# Patient Record
Sex: FEMALE | Race: OTHER | NOT HISPANIC OR LATINO
[De-identification: names, ages, dates, MRNs, and addresses within clinical notes are randomized per-mention and may not be internally consistent; named-entity substitution may affect disease eponyms.]

---

## 2020-11-16 PROBLEM — Z00.00 ENCOUNTER FOR PREVENTIVE HEALTH EXAMINATION: Status: ACTIVE | Noted: 2020-11-16

## 2020-11-18 ENCOUNTER — RESULT CHARGE (OUTPATIENT)
Age: 35
End: 2020-11-18

## 2020-11-18 ENCOUNTER — OUTPATIENT (OUTPATIENT)
Dept: OUTPATIENT SERVICES | Facility: HOSPITAL | Age: 35
LOS: 1 days | Discharge: HOME | End: 2020-11-18

## 2020-11-18 ENCOUNTER — NON-APPOINTMENT (OUTPATIENT)
Age: 35
End: 2020-11-18

## 2020-11-18 ENCOUNTER — APPOINTMENT (OUTPATIENT)
Dept: OBGYN | Facility: CLINIC | Age: 35
End: 2020-11-18
Payer: MEDICAID

## 2020-11-18 VITALS
WEIGHT: 193 LBS | BODY MASS INDEX: 29.25 KG/M2 | HEIGHT: 68.11 IN | DIASTOLIC BLOOD PRESSURE: 66 MMHG | SYSTOLIC BLOOD PRESSURE: 100 MMHG

## 2020-11-18 DIAGNOSIS — Z34.90 ENCOUNTER FOR SUPERVISION OF NORMAL PREGNANCY, UNSPECIFIED, UNSPECIFIED TRIMESTER: ICD-10-CM

## 2020-11-18 DIAGNOSIS — O09.30 SUPERVISION OF PREGNANCY WITH INSUFFICIENT ANTENATAL CARE, UNSPECIFIED TRIMESTER: ICD-10-CM

## 2020-11-18 LAB
BILIRUB UR QL STRIP: NORMAL
CLARITY UR: CLEAR
COLLECTION METHOD: NORMAL
GLUCOSE UR-MCNC: NORMAL
HCG UR QL: 0.2 EU/DL
HGB UR QL STRIP.AUTO: NORMAL
KETONES UR-MCNC: NORMAL
LEUKOCYTE ESTERASE UR QL STRIP: NORMAL
NITRITE UR QL STRIP: NORMAL
PH UR STRIP: 5
PROT UR STRIP-MCNC: NORMAL
SP GR UR STRIP: 1

## 2020-11-18 PROCEDURE — 99204 OFFICE O/P NEW MOD 45 MIN: CPT | Mod: 25

## 2020-11-18 PROCEDURE — 76815 OB US LIMITED FETUS(S): CPT | Mod: 26

## 2020-11-18 RX ORDER — PRENATAL VIT/IRON FUM/FOLIC AC 28MG-0.8MG
28-0.8 TABLET ORAL DAILY
Qty: 30 | Refills: 3 | Status: ACTIVE | COMMUNITY
Start: 2020-11-18 | End: 1900-01-01

## 2020-11-19 LAB
BASOPHILS # BLD AUTO: 0.06 K/UL
BASOPHILS NFR BLD AUTO: 0.8 %
C TRACH RRNA SPEC QL NAA+PROBE: NOT DETECTED
EOSINOPHIL # BLD AUTO: 0.07 K/UL
EOSINOPHIL NFR BLD AUTO: 1 %
GLUCOSE 1H P 50 G GLC PO SERPL-MCNC: 146 MG/DL
HCT VFR BLD CALC: 30.2 %
HGB BLD-MCNC: 9.7 G/DL
IMM GRANULOCYTES NFR BLD AUTO: 1.7 %
LYMPHOCYTES # BLD AUTO: 2.1 K/UL
LYMPHOCYTES NFR BLD AUTO: 29 %
MAN DIFF?: NORMAL
MCHC RBC-ENTMCNC: 27.1 PG
MCHC RBC-ENTMCNC: 32.1 G/DL
MCV RBC AUTO: 84.4 FL
MONOCYTES # BLD AUTO: 0.48 K/UL
MONOCYTES NFR BLD AUTO: 6.6 %
N GONORRHOEA RRNA SPEC QL NAA+PROBE: NOT DETECTED
NEUTROPHILS # BLD AUTO: 4.42 K/UL
NEUTROPHILS NFR BLD AUTO: 60.9 %
PLATELET # BLD AUTO: 261 K/UL
RBC # BLD: 3.58 M/UL
RBC # FLD: 13.4 %
SOURCE AMPLIFICATION: NORMAL
WBC # FLD AUTO: 7.25 K/UL

## 2020-11-20 ENCOUNTER — NON-APPOINTMENT (OUTPATIENT)
Age: 35
End: 2020-11-20

## 2020-11-20 LAB
ABO + RH PNL BLD: NORMAL
BLD GP AB SCN SERPL QL: NORMAL
HBV SURFACE AG SER QL: NONREACTIVE
HGB A MFR BLD: 97.4 %
HGB A2 MFR BLD: 2.6 %
HGB FRACT BLD-IMP: NORMAL
HIV1+2 AB SPEC QL IA.RAPID: NONREACTIVE
MEV IGG FLD QL IA: 10.4 AU/ML
MEV IGG+IGM SER-IMP: NEGATIVE
MUV AB SER-ACNC: POSITIVE
MUV IGG SER QL IA: 206 AU/ML
RUBV IGG FLD-ACNC: 5.7 INDEX
RUBV IGG SER-IMP: POSITIVE
T PALLIDUM AB SER QL IA: NEGATIVE
VZV AB TITR SER: POSITIVE
VZV IGG SER IF-ACNC: 1040 INDEX

## 2020-11-21 DIAGNOSIS — O99.019 ANEMIA COMPLICATING PREGNANCY, UNSPECIFIED TRIMESTER: ICD-10-CM

## 2020-11-21 LAB
BACTERIA UR CULT: NORMAL
GP B STREP DNA SPEC QL NAA+PROBE: DETECTED
GP B STREP DNA SPEC QL NAA+PROBE: NORMAL
HPV HIGH+LOW RISK DNA PNL CVX: NOT DETECTED
LEAD BLD-MCNC: <1 UG/DL
SOURCE GBS: NORMAL

## 2020-11-21 RX ORDER — FERROUS SULFATE 325(65) MG
325 (65 FE) TABLET ORAL 3 TIMES DAILY
Qty: 90 | Refills: 2 | Status: ACTIVE | COMMUNITY
Start: 2020-11-19 | End: 1900-01-01

## 2020-11-22 LAB
M TB IFN-G BLD-IMP: NEGATIVE
QUANTIFERON TB PLUS MITOGEN MINUS NIL: 4.25 IU/ML
QUANTIFERON TB PLUS NIL: 0.02 IU/ML
QUANTIFERON TB PLUS TB1 MINUS NIL: 0.04 IU/ML
QUANTIFERON TB PLUS TB2 MINUS NIL: 0.03 IU/ML

## 2020-11-24 ENCOUNTER — NON-APPOINTMENT (OUTPATIENT)
Age: 35
End: 2020-11-24

## 2020-11-24 ENCOUNTER — INPATIENT (INPATIENT)
Facility: HOSPITAL | Age: 35
LOS: 2 days | Discharge: HOME | End: 2020-11-27
Attending: OBSTETRICS & GYNECOLOGY | Admitting: OBSTETRICS & GYNECOLOGY
Payer: MEDICAID

## 2020-11-24 ENCOUNTER — APPOINTMENT (OUTPATIENT)
Dept: ANTEPARTUM | Facility: CLINIC | Age: 35
End: 2020-11-24

## 2020-11-24 VITALS
HEART RATE: 68 BPM | DIASTOLIC BLOOD PRESSURE: 76 MMHG | RESPIRATION RATE: 18 BRPM | HEIGHT: 68.5 IN | SYSTOLIC BLOOD PRESSURE: 116 MMHG | WEIGHT: 191.8 LBS | TEMPERATURE: 98 F

## 2020-11-24 LAB
AMPHET UR-MCNC: NEGATIVE — SIGNIFICANT CHANGE UP
APPEARANCE UR: ABNORMAL
AR GENE MUT ANL BLD/T: NORMAL
BACTERIA # UR AUTO: ABNORMAL
BARBITURATES UR SCN-MCNC: NEGATIVE — SIGNIFICANT CHANGE UP
BASOPHILS # BLD AUTO: 0.08 K/UL — SIGNIFICANT CHANGE UP (ref 0–0.2)
BASOPHILS NFR BLD AUTO: 0.8 % — SIGNIFICANT CHANGE UP (ref 0–1)
BENZODIAZ UR-MCNC: NEGATIVE — SIGNIFICANT CHANGE UP
BILIRUB UR-MCNC: NEGATIVE — SIGNIFICANT CHANGE UP
BLD GP AB SCN SERPL QL: SIGNIFICANT CHANGE UP
BUPRENORPHINE SCREEN, URINE RESULT: NEGATIVE — SIGNIFICANT CHANGE UP
COCAINE METAB.OTHER UR-MCNC: NEGATIVE — SIGNIFICANT CHANGE UP
COLOR SPEC: ABNORMAL
DIFF PNL FLD: ABNORMAL
EOSINOPHIL # BLD AUTO: 0.05 K/UL — SIGNIFICANT CHANGE UP (ref 0–0.7)
EOSINOPHIL NFR BLD AUTO: 0.5 % — SIGNIFICANT CHANGE UP (ref 0–8)
EPI CELLS # UR: >27 /HPF — HIGH (ref 0–5)
FENTANYL UR QL: NEGATIVE — SIGNIFICANT CHANGE UP
GLUCOSE UR QL: NEGATIVE — SIGNIFICANT CHANGE UP
HCT VFR BLD CALC: 38.2 % — SIGNIFICANT CHANGE UP (ref 37–47)
HGB BLD-MCNC: 12.3 G/DL — SIGNIFICANT CHANGE UP (ref 12–16)
HYALINE CASTS # UR AUTO: 113 /LPF — HIGH (ref 0–7)
IMM GRANULOCYTES NFR BLD AUTO: 1.6 % — HIGH (ref 0.1–0.3)
KETONES UR-MCNC: SIGNIFICANT CHANGE UP
L&D DRUG SCREEN, URINE: SIGNIFICANT CHANGE UP
LEUKOCYTE ESTERASE UR-ACNC: ABNORMAL
LYMPHOCYTES # BLD AUTO: 2.3 K/UL — SIGNIFICANT CHANGE UP (ref 1.2–3.4)
LYMPHOCYTES # BLD AUTO: 21.8 % — SIGNIFICANT CHANGE UP (ref 20.5–51.1)
MCHC RBC-ENTMCNC: 27.6 PG — SIGNIFICANT CHANGE UP (ref 27–31)
MCHC RBC-ENTMCNC: 32.2 G/DL — SIGNIFICANT CHANGE UP (ref 32–37)
MCV RBC AUTO: 85.8 FL — SIGNIFICANT CHANGE UP (ref 81–99)
METHADONE UR-MCNC: NEGATIVE — SIGNIFICANT CHANGE UP
MONOCYTES # BLD AUTO: 0.75 K/UL — HIGH (ref 0.1–0.6)
MONOCYTES NFR BLD AUTO: 7.1 % — SIGNIFICANT CHANGE UP (ref 1.7–9.3)
NEUTROPHILS # BLD AUTO: 7.2 K/UL — HIGH (ref 1.4–6.5)
NEUTROPHILS NFR BLD AUTO: 68.2 % — SIGNIFICANT CHANGE UP (ref 42.2–75.2)
NITRITE UR-MCNC: NEGATIVE — SIGNIFICANT CHANGE UP
NRBC # BLD: 0 /100 WBCS — SIGNIFICANT CHANGE UP (ref 0–0)
OPIATES UR-MCNC: NEGATIVE — SIGNIFICANT CHANGE UP
OXYCODONE UR-MCNC: NEGATIVE — SIGNIFICANT CHANGE UP
PCP UR-MCNC: NEGATIVE — SIGNIFICANT CHANGE UP
PH UR: 6 — SIGNIFICANT CHANGE UP (ref 5–8)
PLATELET # BLD AUTO: 297 K/UL — SIGNIFICANT CHANGE UP (ref 130–400)
PRENATAL SYPHILIS TEST: SIGNIFICANT CHANGE UP
PROPOXYPHENE QUALITATIVE URINE RESULT: NEGATIVE — SIGNIFICANT CHANGE UP
PROT UR-MCNC: ABNORMAL
RBC # BLD: 4.45 M/UL — SIGNIFICANT CHANGE UP (ref 4.2–5.4)
RBC # FLD: 14 % — SIGNIFICANT CHANGE UP (ref 11.5–14.5)
RBC CASTS # UR COMP ASSIST: >720 /HPF — HIGH (ref 0–4)
SARS-COV-2 RNA SPEC QL NAA+PROBE: SIGNIFICANT CHANGE UP
SP GR SPEC: 1.02 — SIGNIFICANT CHANGE UP (ref 1.01–1.03)
UROBILINOGEN FLD QL: SIGNIFICANT CHANGE UP
WBC # BLD: 10.55 K/UL — SIGNIFICANT CHANGE UP (ref 4.8–10.8)
WBC # FLD AUTO: 10.55 K/UL — SIGNIFICANT CHANGE UP (ref 4.8–10.8)
WBC UR QL: 289 /HPF — HIGH (ref 0–5)

## 2020-11-24 PROCEDURE — 59409 OBSTETRICAL CARE: CPT | Mod: U9

## 2020-11-24 RX ORDER — FERROUS FUMARATE, FOLIC ACID 200; 2.6; 1500; 120; 20; 30; 3; 3.4; 20; 50; 1000; 10; 200; 27; 25; 50; 55; 70; 45; 15 MG/1; MG/1; UG/1; MG/1; UG/1; MG/1; MG/1; MG/1; MG/1; MG/1; UG/1; UG/1; MG/1; MG/1; MG/1; UG/1; MG/1; UG/1; UG/1; UG/1
27-1 TABLET ORAL DAILY
Qty: 90 | Refills: 3 | Status: ACTIVE | COMMUNITY
Start: 2020-11-24 | End: 1900-01-01

## 2020-11-24 RX ORDER — LANOLIN
1 OINTMENT (GRAM) TOPICAL EVERY 6 HOURS
Refills: 0 | Status: DISCONTINUED | OUTPATIENT
Start: 2020-11-24 | End: 2020-11-27

## 2020-11-24 RX ORDER — DIPHENHYDRAMINE HCL 50 MG
25 CAPSULE ORAL EVERY 6 HOURS
Refills: 0 | Status: DISCONTINUED | OUTPATIENT
Start: 2020-11-24 | End: 2020-11-27

## 2020-11-24 RX ORDER — AMPICILLIN TRIHYDRATE 250 MG
CAPSULE ORAL
Refills: 0 | Status: DISCONTINUED | OUTPATIENT
Start: 2020-11-24 | End: 2020-11-24

## 2020-11-24 RX ORDER — PRAMOXINE HYDROCHLORIDE 150 MG/15G
1 AEROSOL, FOAM RECTAL EVERY 4 HOURS
Refills: 0 | Status: DISCONTINUED | OUTPATIENT
Start: 2020-11-24 | End: 2020-11-27

## 2020-11-24 RX ORDER — AER TRAVELER 0.5 G/1
1 SOLUTION RECTAL; TOPICAL EVERY 4 HOURS
Refills: 0 | Status: DISCONTINUED | OUTPATIENT
Start: 2020-11-24 | End: 2020-11-27

## 2020-11-24 RX ORDER — SIMETHICONE 80 MG/1
80 TABLET, CHEWABLE ORAL EVERY 4 HOURS
Refills: 0 | Status: DISCONTINUED | OUTPATIENT
Start: 2020-11-24 | End: 2020-11-27

## 2020-11-24 RX ORDER — OXYCODONE HYDROCHLORIDE 5 MG/1
5 TABLET ORAL
Refills: 0 | Status: DISCONTINUED | OUTPATIENT
Start: 2020-11-24 | End: 2020-11-27

## 2020-11-24 RX ORDER — SODIUM CHLORIDE 9 MG/ML
3 INJECTION INTRAMUSCULAR; INTRAVENOUS; SUBCUTANEOUS EVERY 8 HOURS
Refills: 0 | Status: DISCONTINUED | OUTPATIENT
Start: 2020-11-24 | End: 2020-11-27

## 2020-11-24 RX ORDER — IBUPROFEN 200 MG
600 TABLET ORAL EVERY 6 HOURS
Refills: 0 | Status: COMPLETED | OUTPATIENT
Start: 2020-11-24 | End: 2021-10-23

## 2020-11-24 RX ORDER — SODIUM CHLORIDE 9 MG/ML
1000 INJECTION, SOLUTION INTRAVENOUS
Refills: 0 | Status: DISCONTINUED | OUTPATIENT
Start: 2020-11-24 | End: 2020-11-24

## 2020-11-24 RX ORDER — OXYTOCIN 10 UNIT/ML
333.33 VIAL (ML) INJECTION
Qty: 20 | Refills: 0 | Status: DISCONTINUED | OUTPATIENT
Start: 2020-11-24 | End: 2020-11-27

## 2020-11-24 RX ORDER — IBUPROFEN 200 MG
600 TABLET ORAL EVERY 6 HOURS
Refills: 0 | Status: DISCONTINUED | OUTPATIENT
Start: 2020-11-24 | End: 2020-11-27

## 2020-11-24 RX ORDER — OXYCODONE HYDROCHLORIDE 5 MG/1
5 TABLET ORAL ONCE
Refills: 0 | Status: DISCONTINUED | OUTPATIENT
Start: 2020-11-24 | End: 2020-11-27

## 2020-11-24 RX ORDER — AMPICILLIN TRIHYDRATE 250 MG
1 CAPSULE ORAL EVERY 4 HOURS
Refills: 0 | Status: DISCONTINUED | OUTPATIENT
Start: 2020-11-24 | End: 2020-11-24

## 2020-11-24 RX ORDER — KETOROLAC TROMETHAMINE 30 MG/ML
30 SYRINGE (ML) INJECTION ONCE
Refills: 0 | Status: DISCONTINUED | OUTPATIENT
Start: 2020-11-24 | End: 2020-11-24

## 2020-11-24 RX ORDER — MAGNESIUM HYDROXIDE 400 MG/1
30 TABLET, CHEWABLE ORAL
Refills: 0 | Status: DISCONTINUED | OUTPATIENT
Start: 2020-11-24 | End: 2020-11-27

## 2020-11-24 RX ORDER — BENZOCAINE 10 %
1 GEL (GRAM) MUCOUS MEMBRANE EVERY 6 HOURS
Refills: 0 | Status: DISCONTINUED | OUTPATIENT
Start: 2020-11-24 | End: 2020-11-27

## 2020-11-24 RX ORDER — DIBUCAINE 1 %
1 OINTMENT (GRAM) RECTAL EVERY 6 HOURS
Refills: 0 | Status: DISCONTINUED | OUTPATIENT
Start: 2020-11-24 | End: 2020-11-27

## 2020-11-24 RX ORDER — ACETAMINOPHEN 500 MG
975 TABLET ORAL
Refills: 0 | Status: DISCONTINUED | OUTPATIENT
Start: 2020-11-24 | End: 2020-11-27

## 2020-11-24 RX ORDER — AMPICILLIN TRIHYDRATE 250 MG
2 CAPSULE ORAL ONCE
Refills: 0 | Status: COMPLETED | OUTPATIENT
Start: 2020-11-24 | End: 2020-11-24

## 2020-11-24 RX ORDER — INFLUENZA VIRUS VACCINE 15; 15; 15; 15 UG/.5ML; UG/.5ML; UG/.5ML; UG/.5ML
0.5 SUSPENSION INTRAMUSCULAR ONCE
Refills: 0 | Status: COMPLETED | OUTPATIENT
Start: 2020-11-24 | End: 2020-11-26

## 2020-11-24 RX ORDER — HYDROCORTISONE 1 %
1 OINTMENT (GRAM) TOPICAL EVERY 6 HOURS
Refills: 0 | Status: DISCONTINUED | OUTPATIENT
Start: 2020-11-24 | End: 2020-11-27

## 2020-11-24 RX ORDER — OXYTOCIN 10 UNIT/ML
333.33 VIAL (ML) INJECTION
Qty: 20 | Refills: 0 | Status: DISCONTINUED | OUTPATIENT
Start: 2020-11-24 | End: 2020-11-24

## 2020-11-24 RX ADMIN — Medication 975 MILLIGRAM(S): at 22:01

## 2020-11-24 RX ADMIN — Medication 600 MILLIGRAM(S): at 17:19

## 2020-11-24 RX ADMIN — Medication 216 GRAM(S): at 08:23

## 2020-11-24 RX ADMIN — Medication 30 MILLIGRAM(S): at 09:13

## 2020-11-24 RX ADMIN — Medication 30 MILLIGRAM(S): at 09:29

## 2020-11-24 RX ADMIN — Medication 1000 MILLIUNIT(S)/MIN: at 10:49

## 2020-11-24 RX ADMIN — Medication 600 MILLIGRAM(S): at 17:49

## 2020-11-24 RX ADMIN — SODIUM CHLORIDE 3 MILLILITER(S): 9 INJECTION INTRAMUSCULAR; INTRAVENOUS; SUBCUTANEOUS at 14:09

## 2020-11-24 NOTE — OB PROVIDER H&P - ASSESSMENT
35y.o.  @ 39.2wks in active labor, GBS positive, AMA, late registrant from [place]  Admit to L&D  IVF, labs  Continuous efm and toco  Ampicillin for GBS+  Pain management PRN  Anticipate   Dr. Meza/ Dr. Flaherty Aware

## 2020-11-24 NOTE — OB RN DELIVERY SUMMARY - NS_GENERALBABYACOMMENTA_OBGYN_ALL_OB_FT
report given to [facility].n - observation nursery- + gbs - only one dose of ampi - + mecoium, no further stool or void

## 2020-11-24 NOTE — OB PROVIDER H&P - NSHPPHYSICALEXAM_GEN_ALL_CORE
T(C): 36.7 (11-24-20 @ 08:05), Max: 36.7 (11-24-20 @ 08:05)  HR: 68 (11-24-20 @ 08:05) (68 - 68)  BP: 116/76 (11-24-20 @ 08:05) (116/76 - 116/76)  RR: 18 (11-24-20 @ 08:05) (18 - 18)    VE: 10/100/0/intact  Ctx: q 3-4 min  FHR: 150 moderate variability, Cat I

## 2020-11-24 NOTE — OB PROVIDER H&P - NSHPLABSRESULTS_GEN_ALL_CORE
Late registrant at 38wks from [place]. No prenatal chart available    - Did not do GTT    Measles non-immune

## 2020-11-24 NOTE — OB PROVIDER H&P - HISTORY OF PRESENT ILLNESS
Pt is a 35y.o.  @ 39.2wks by LMP . Pt is a late registrant from [place] at 38wks. Pt reports PNC in [place] but records not available. Pt c/o ctx since 0400am, +VB. Pt denies LOF and states good FM     Pt denies signs or symptoms of Covid-19

## 2020-11-25 ENCOUNTER — TRANSCRIPTION ENCOUNTER (OUTPATIENT)
Age: 35
End: 2020-11-25

## 2020-11-25 ENCOUNTER — APPOINTMENT (OUTPATIENT)
Dept: OBGYN | Facility: CLINIC | Age: 35
End: 2020-11-25

## 2020-11-25 LAB
HCT VFR BLD CALC: 30.8 % — LOW (ref 37–47)
HGB BLD-MCNC: 9.8 G/DL — LOW (ref 12–16)
MCHC RBC-ENTMCNC: 27.7 PG — SIGNIFICANT CHANGE UP (ref 27–31)
MCHC RBC-ENTMCNC: 31.8 G/DL — LOW (ref 32–37)
MCV RBC AUTO: 87 FL — SIGNIFICANT CHANGE UP (ref 81–99)
NRBC # BLD: 0 /100 WBCS — SIGNIFICANT CHANGE UP (ref 0–0)
PLATELET # BLD AUTO: 269 K/UL — SIGNIFICANT CHANGE UP (ref 130–400)
RBC # BLD: 3.54 M/UL — LOW (ref 4.2–5.4)
RBC # FLD: 14.4 % — SIGNIFICANT CHANGE UP (ref 11.5–14.5)
WBC # BLD: 9.46 K/UL — SIGNIFICANT CHANGE UP (ref 4.8–10.8)
WBC # FLD AUTO: 9.46 K/UL — SIGNIFICANT CHANGE UP (ref 4.8–10.8)

## 2020-11-25 PROCEDURE — 99238 HOSP IP/OBS DSCHRG MGMT 30/<: CPT

## 2020-11-25 PROCEDURE — 99231 SBSQ HOSP IP/OBS SF/LOW 25: CPT

## 2020-11-25 RX ORDER — IBUPROFEN 200 MG
1 TABLET ORAL
Qty: 0 | Refills: 0 | DISCHARGE
Start: 2020-11-25

## 2020-11-25 RX ORDER — ACETAMINOPHEN 500 MG
3 TABLET ORAL
Qty: 0 | Refills: 0 | DISCHARGE
Start: 2020-11-25

## 2020-11-25 RX ADMIN — Medication 600 MILLIGRAM(S): at 01:45

## 2020-11-25 RX ADMIN — Medication 600 MILLIGRAM(S): at 06:31

## 2020-11-25 RX ADMIN — Medication 600 MILLIGRAM(S): at 17:10

## 2020-11-25 RX ADMIN — Medication 600 MILLIGRAM(S): at 11:46

## 2020-11-25 RX ADMIN — Medication 1 TABLET(S): at 11:47

## 2020-11-25 RX ADMIN — Medication 600 MILLIGRAM(S): at 01:16

## 2020-11-25 NOTE — DISCHARGE NOTE OB - CARE PROVIDER_API CALL
Ophelia Colorado)  OBSGYN  Physicians  [address] [place] [address]  Phone: [phone]  Fax: [phone]  Follow Up Time:

## 2020-11-25 NOTE — PROGRESS NOTE ADULT - ASSESSMENT
34yo P2 s/p uncomplicated , late transfer of care, PPD#1, recovering well    -Monitor vitals, bleeding  -Encourage PO hydration, ambulation  -Regular Diet  -Pain managment prn  -Simethicone prn  -Anticipate d/c home tomorrow    Dr. Colorado and Dr. Slater aware

## 2020-11-25 NOTE — PROGRESS NOTE ADULT - ATTENDING COMMENTS
Patient is PPD1 s/p , doing well.   - Follow up labs  - Routine postpartum care  - Anticipate d/c today  - Follow up 6 weeks for postpartum visit

## 2020-11-25 NOTE — DISCHARGE NOTE OB - PATIENT PORTAL LINK FT
You can access the FollowMyHealth Patient Portal offered by [facility] by registering at the following website: [email or web address]. By joining [facility]’s FollowMyHealth portal, you will also be able to view your health information using other applications (apps) compatible with our system.

## 2020-11-26 LAB
FMR1 GENE MUT ANL BLD/T: NORMAL
SARS-COV-2 IGG SERPL QL IA: NEGATIVE — SIGNIFICANT CHANGE UP
SARS-COV-2 IGM SERPL IA-ACNC: 0.09 INDEX — SIGNIFICANT CHANGE UP

## 2020-11-26 PROCEDURE — 99238 HOSP IP/OBS DSCHRG MGMT 30/<: CPT

## 2020-11-26 RX ADMIN — Medication 600 MILLIGRAM(S): at 14:58

## 2020-11-26 RX ADMIN — Medication 1 APPLICATION(S): at 23:38

## 2020-11-26 RX ADMIN — Medication 1 TABLET(S): at 15:02

## 2020-11-26 RX ADMIN — Medication 975 MILLIGRAM(S): at 09:38

## 2020-11-26 RX ADMIN — Medication 600 MILLIGRAM(S): at 07:03

## 2020-11-26 RX ADMIN — Medication 975 MILLIGRAM(S): at 09:08

## 2020-11-26 RX ADMIN — Medication 600 MILLIGRAM(S): at 06:33

## 2020-11-26 RX ADMIN — Medication 600 MILLIGRAM(S): at 19:40

## 2020-11-26 RX ADMIN — Medication 600 MILLIGRAM(S): at 19:06

## 2020-11-26 RX ADMIN — INFLUENZA VIRUS VACCINE 0.5 MILLILITER(S): 15; 15; 15; 15 SUSPENSION INTRAMUSCULAR at 06:33

## 2020-11-26 RX ADMIN — Medication 600 MILLIGRAM(S): at 23:36

## 2020-11-26 NOTE — PROGRESS NOTE ADULT - ASSESSMENT
34yo P2 s/p uncomplicated , late transfer of care, PPD#2, recovering well    -Monitor vitals, bleeding  -Encourage PO hydration, ambulation  -Regular Diet  -Pain managment prn  -Simethicone prn  -Anticipate d/c home tomorrow    Dr. Pineda and Dr. Arreaga aware

## 2020-11-26 NOTE — PROGRESS NOTE ADULT - SUBJECTIVE AND OBJECTIVE BOX
Chief Complaint: Postpartum    HPI: Pt doing well, pain well controlled. No overnight events, no acute complaints. Denies fever, chills, chest pain, SOB, nausea, vomiting, LE pain. Minimal bleeding, voiding, ambulating, tolerating regular diet, passing flatus. Breast and bottlefeeding.    PAST MEDICAL & SURGICAL HISTORY:  No pertinent past medical history    No significant past surgical history      Physical Exam  Vital Signs Last 24 Hrs  T(F): 96.3 (25 Nov 2020 23:30), Max: 98.1 (25 Nov 2020 16:48)  HR: 67 (25 Nov 2020 23:30) (67 - 85)  BP: 105/59 (25 Nov 2020 23:30) (103/62 - 110/59)  RR: 18 (25 Nov 2020 23:30) (18 - 18)    Physical exam:  General - AAOx3, NAD  Heart - S1S2, RRR  Lungs - CTA BL  Abdomen:  - Soft, nontender, nondistended, BS+  - Fundus firm, nontender, below the umbilicus  Pelvis/Vagina - Normal Lochia  Extremities - No calf tenderness, no swelling    Labs:                        9.8    9.46  )-----------( 269      ( 25 Nov 2020 12:16 )             30.8                         12.3   10.55 )-----------( 297      ( 24 Nov 2020 08:00 )             38.2     ABO RH Interpretation: AB POS (11-24-20 @ 08:34)  Antibody Screen: NEG (11-24-20 @ 08:34)

## 2020-11-27 VITALS
SYSTOLIC BLOOD PRESSURE: 120 MMHG | RESPIRATION RATE: 18 BRPM | DIASTOLIC BLOOD PRESSURE: 61 MMHG | HEART RATE: 76 BPM | TEMPERATURE: 98 F

## 2020-11-27 RX ADMIN — Medication 975 MILLIGRAM(S): at 04:15

## 2020-11-27 RX ADMIN — Medication 975 MILLIGRAM(S): at 03:44

## 2020-11-27 RX ADMIN — Medication 975 MILLIGRAM(S): at 08:30

## 2020-11-27 RX ADMIN — Medication 600 MILLIGRAM(S): at 00:15

## 2020-11-27 RX ADMIN — Medication 600 MILLIGRAM(S): at 06:41

## 2020-11-27 RX ADMIN — Medication 600 MILLIGRAM(S): at 07:10

## 2020-11-27 RX ADMIN — Medication 975 MILLIGRAM(S): at 09:30

## 2020-11-27 NOTE — PROGRESS NOTE ADULT - ASSESSMENT
34yo P2 s/p uncomplicated , late transfer of care, PPD#3, recovering well    -Monitor vitals, bleeding  -Encourage PO hydration, ambulation  -Regular Diet  -Pain managment prn  -Simethicone prn  -Anticipate d/c home     Dr. Pat and Dr. Slater aware 34yo P2 s/p uncomplicated , late transfer of care, PPD#3, recovering well    -Monitor vitals, bleeding  -Encourage PO hydration, ambulation  -Regular Diet  -Pain managment prn  -Simethicone prn  -Anticipate d/c home, instructions and precautions discussed     Dr. Pat and Dr. Slater aware

## 2020-11-27 NOTE — PROGRESS NOTE ADULT - ATTENDING COMMENTS
PPD#3 , doing well  - acute blood loss anemia - stable vitals, minimal bleeding   - d/c home with iron   - F/u 6 weeks for PP visit

## 2020-11-27 NOTE — PROGRESS NOTE ADULT - SUBJECTIVE AND OBJECTIVE BOX
Chief Complaint: Postpartum    HPI: Pt doing well, pain well controlled. No overnight events, no acute complaints. Denies fever, chills, chest pain, SOB, nausea, vomiting, LE pain. Minimal bleeding, voiding, ambulating, tolerating regular diet, passing flatus. Breastfeeding and bottle feeding.    PAST MEDICAL & SURGICAL HISTORY:  No pertinent past medical history    No significant past surgical history        Physical Exam  Vital Signs Last 24 Hrs  T(F): 96.9 (26 Nov 2020 23:42), Max: 98.7 (26 Nov 2020 15:24)  HR: 80 (26 Nov 2020 23:42) (65 - 80)  BP: 121/69 (26 Nov 2020 23:42) (110/56 - 121/69)  RR: 18 (26 Nov 2020 23:42) (18 - 19)    Physical exam:  General - AAOx3, NAD  Heart - S1S2, RRR  Lungs - CTA BL  Abdomen:  - Soft, nontender, nondistended, BS+  - Fundus firm, nontender, below the umbilicus  Pelvis/Vagina - Normal Lochia  Extremities - No calf tenderness, no swelling    Labs:                        9.8    9.46  )-----------( 269      ( 25 Nov 2020 12:16 )             30.8                         12.3   10.55 )-----------( 297      ( 24 Nov 2020 08:00 )             38.2     ABO RH Interpretation: AB POS (11-24-20 @ 08:34)  Antibody Screen: NEG (11-24-20 @ 08:34)

## 2020-11-28 LAB — CFTR MUT TESTED BLD/T: NEGATIVE

## 2020-11-30 DIAGNOSIS — Z28.21 IMMUNIZATION NOT CARRIED OUT BECAUSE OF PATIENT REFUSAL: ICD-10-CM

## 2020-11-30 DIAGNOSIS — Z3A.39 39 WEEKS GESTATION OF PREGNANCY: ICD-10-CM

## 2020-12-10 ENCOUNTER — NON-APPOINTMENT (OUTPATIENT)
Age: 35
End: 2020-12-10

## 2022-04-07 NOTE — PROGRESS NOTE ADULT - SUBJECTIVE AND OBJECTIVE BOX
Chief Complaint: Postpartum    HPI: Pt doing well, pain well controlled. No overnight events, no acute complaints. Denies fever, chills, chest pain, SOB, nausea, vomiting, LE pain. Minimal bleeding, voiding, ambulating, tolerating regular diet, passing flatus. Breastfeeding.    PAST MEDICAL & SURGICAL HISTORY:  No pertinent past medical history    No significant past surgical history    Physical Exam  Vital Signs Last 24 Hrs  T(F): 98.2 (24 Nov 2020 23:10), Max: 98.2 (24 Nov 2020 23:10)  HR: 68 (24 Nov 2020 23:10) (60 - 97)  BP: 98/50 (24 Nov 2020 23:10) (98/50 - 124/81)  RR: 18 (24 Nov 2020 23:10) (18 - 18)    Physical exam:  General - AAOx3, NAD  Heart - S1S2, RRR  Lungs - CTA BL  Abdomen:  - Soft, nontender, nondistended, BS+  - Fundus firm, nontender, below the umbilicus  Pelvis/Vagina - Normal Lochia  Extremities - No calf tenderness, no swelling    Labs:                        12.3   10.55 )-----------( 297      ( 24 Nov 2020 08:00 )             38.2     ABO RH Interpretation: AB POS (11-24-20 @ 08:34)  Antibody Screen: NEG (11-24-20 @ 08:34)    Prenatal Syphilis Test: Nonreact (11-24-20 @ 08:00)   Viral URI

## 2022-10-27 NOTE — OB PROVIDER DELIVERY SUMMARY - NSPROVIDERDELIVERYNOTE_OBGYN_ALL_OB_FT
Pt was fully dilated and pushing. Fetal head OA, restituted to LOT. Anterior shoulder delivered, followed by posterior shoulder and remaining body atraumatically. Mouth and nose bulb suctioned. Delayed cord clamping performed, then clamped and cut.  handed to the mother. Cord gases collected x2. Placenta delivered intact with membranes. Pitocin administered, uterus massaged until fundus found to be firm. Cervix, vagina and perineum inspected, no lacerations noted, good hemostasis noted. Live male infant delivered, APGARS 9, 9, weighing 8lb 1oz      Dr. MALAIKA Flaherty present for the delivery. Pt was fully dilated and pushing. Fetal head OA, restituted to LOT. Anterior shoulder delivered, followed by posterior shoulder and remaining body atraumatically. Mouth and nose bulb suctioned. Delayed cord clamping performed, then clamped and cut.  handed to the mother. Cord gases collected x2. Placenta delivered intact with membranes. Pitocin administered, uterus massaged until fundus found to be firm. Cervix, vagina and perineum inspected, no lacerations noted, good hemostasis noted. Live male infant delivered, APGARS 9, 9, weighing 8lb 1oz      Dr. MALAIKA Flaherty present for the delivery.  Present and participated in  of above patient. I was present for the entire delivery Pt was fully dilated and pushing. Fetal head OA, restituted to LOT. Anterior shoulder delivered, followed by posterior shoulder and remaining body atraumatically. Mouth and nose bulb suctioned. Delayed cord clamping performed, then clamped and cut.  handed to the mother. Cord gases collected x2. Placenta delivered intact with membranes. Pitocin administered, uterus massaged until fundus found to be firm. Cervix, vagina and perineum inspected, no lacerations noted, good hemostasis noted. Live male infant delivered, APGARS 9, 9, weighing 8lb 1oz      Dr. MALAIKA Flaherty present for the delivery.  I was physically present for the key portions of the evaluation and management (E/M) service provided. I agree with the above history, physical and plan which I have reviewed and edited where appropriate. Thalidomide Pregnancy And Lactation Text: This medication is Pregnancy Category X and is absolutely contraindicated during pregnancy. It is unknown if it is excreted in breast milk.